# Patient Record
Sex: MALE
[De-identification: names, ages, dates, MRNs, and addresses within clinical notes are randomized per-mention and may not be internally consistent; named-entity substitution may affect disease eponyms.]

---

## 2022-04-01 ENCOUNTER — HOSPITAL ENCOUNTER (EMERGENCY)
Dept: HOSPITAL 75 - ER FS | Age: 22
Discharge: HOME | End: 2022-04-01
Payer: COMMERCIAL

## 2022-04-01 VITALS — SYSTOLIC BLOOD PRESSURE: 132 MMHG | DIASTOLIC BLOOD PRESSURE: 85 MMHG

## 2022-04-01 VITALS — HEIGHT: 70 IN | BODY MASS INDEX: 24.05 KG/M2 | WEIGHT: 167.99 LBS

## 2022-04-01 DIAGNOSIS — S61.512A: Primary | ICD-10-CM

## 2022-04-01 DIAGNOSIS — M79.9: ICD-10-CM
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
## 2022-04-01 NOTE — DIAGNOSTIC IMAGING REPORT
Indication: Left wrist injury and laceration.



Time of Exam: 8:11 AM



Two views of the left forearm were obtained. Alignment at the

elbow and wrist is normal. There appears to be a soft tissue

injury along the ulnar side of the wrist. No definite radiopaque

soft tissue foreign body is seen. No acute bony abnormality is

detected.



IMPRESSION: Soft tissue injury of the wrist. No radiopaque

foreign body or acute bony abnormality is detected.



Dictated by: 



  Dictated on workstation # [id number]

## 2022-04-01 NOTE — ED UPPER EXTREMITY
General


Chief Complaint:  Laceration


Stated Complaint:  LT HAND INJ


Source:  patient


Exam Limitations:  no limitations





History of Present Illness


Date Seen by Provider:  2022


Time Seen by Provider:  07:53


Initial Comments


21 yo male presenting with complaint of injury to left forearm. he was using a 

jointer and got his sleeve caught as he was advancing a board. he has laceration

to his wrist area with blood along his arm. He has intact movement and sensation

to fingers and wrist. He is right hand dominant. He had his last tetanus when he

was in high school. he has a history of migraine headaches and is prescribed 

Propranolol for those but takes it only as needed. He denies other injuries.


Onset:  just prior to arrival


Severity:  moderate


Pain/Injury Location:  left forearm, left wrist


Method of Injury:  other (caught in equipment)


Modifying Factors:  Worse With Movement





Allergies and Home Medications


Allergies


Coded Allergies:  


     Penicillins (Verified  Allergy, Unknown, 22)


   Per family history





Patient Home Medication List


Home Medication List Reviewed:  Yes


Clindamycin HCl (Clindamycin HCl) 300 Mg Capsule, 300 MG PO TID


   Prescribed by: TEQUILA KENNEDY on 22 0921





Review of Systems


Constitutional:  no symptoms reported


EENTM:  no symptoms reported


Respiratory:  no symptoms reported


Cardiovascular:  no symptoms reported


Gastrointestinal:  no symptoms reported


Genitourinary:  no symptoms reported


Musculoskeletal:  see HPI


Skin:  see HPI, other (laceration to left wrist)


Psychiatric/Neurological:  Denies Numbness, Denies Paresthesia, Denies Weakness





Past Medical-Social-Family Hx


Patient Social History


Tobacco Use?:  No


Use of E-Cig and/or Vaping dev:  No


Substance use?:  No


Alcohol Use?:  No





Past Medical History


Surgery/Hospitalization HX:  


Migraine Headaches





Physical Exam


Vital Signs





Vital Signs - First Documented








 22





 07:52


 


Temp 36.6


 


Pulse 73


 


Resp 18


 


B/P (MAP) 132/85 (101)


 


Pulse Ox 100


 


O2 Delivery Room Air





Capillary Refill :


Height, Weight, BMI


Height: '"


Weight: lbs. oz. kg;  BMI


Method:


General Appearance:  WD/WN, no apparent distress


HEENT:  PERRL/EOMI


Cardiovascular:  normal peripheral pulses


Elbow/Forearm:  normal ROM, Left, pain (around laceration), soft tissue 

tenderness (around laceration)


Wrist:  Yes normal ROM, Yes soft tissue tenderness


Hand:  normal inspection, non-tender, normal ROM


Neurologic/Tendon:  normal sensation, normal motor functions, normal tendon 

functions


Neurologic/Psychiatric:  no motor/sensory deficits, alert, normal mood/affect, 

oriented x 3


Skin:  warm/dry, other (avulsion/laceration left wrist ulnar aspect)





Procedures/Interventions





   Wound Location:  Upper Extremities (left wrist)


   Wound Length (cm):  4.3


   Wound's Depth, Shape:  irregular, contused tissue, sub Q


   Wound Explored:  contaminated


   Irrigated w/ Saline (ccs):  500


   Betadine Prep?:  Yes


   Anesthesia:  1% Lidocaine


   Volume Anesthetic (ccs):  6


   Suture:  Ethlion


   Suture Size:  4-0


   Number of Sutures:  5


   Layer Closure?:  1


   Sterile Dressing Applied?:  Yes


Progress


After obtaining verbal informed consent from the patient the wound was 

anesthetized with 1% plain lidocaine.  Then using sterile water and Betadine the

wound was irrigated and flushed.  No foreign bodies were seen.  Using 4-0 

Ethilon the wound edges for approximated with 5 simple interrupted stitches.  

Patient tolerated procedure well without any immediate complication.  Counseled 

on follow-up and return precautions.  Will treat with clindamycin for antibiotic

prophylaxis.  Counseled on suture removal in 10 to 14 days.  Clean dry dressing 

was applied with Ace bandage for pressure dressing.





   Wound Location:  Upper Extremities (Left wrist/forearm)


   Wound Length (cm):  1.6


   Wound's Depth, Shape:  flap, sub Q


   Wound Explored:  clean


   Irrigated w/ Saline (ccs):  500


   Betadine Prep?:  Yes


   Anesthesia:  1% Lidocaine


   Volume Anesthetic (ccs):  2


   Suture:  Ethlion


   Suture Size:  4-0


   Number of Sutures:  3


   Layer Closure?:  1


   Sterile Dressing Applied?:  Yes


Progress


After obtaining verbal informed consent from the patient the wound was anes

thetized with 1% plain lidocaine.  Then using sterile water and Betadine the 

wound was irrigated.  There was no foreign body seen or visualized.  The wound 

edges were approximated using 4-0 Ethilon.  There were a total of 3 simple 

interrupted stitches placed with a 4-0 Ethilon.  Patient tolerated procedure 

well without any immediate complication.  Applied clean dry sterile dressing.  

Counseled on follow-up and return precautions.  Advised to have stitches out in 

10 to 14 days would be seen sooner if concern for infections.  Treat with 

clindamycin for antibiotic prophylaxis.





   Wound Location:  Upper Extremities (Left wrist and forearm)


   Wound Length (cm):  2.2


   Wound's Depth, Shape:  flap, contused tissue, sub Q


   Wound Explored:  contaminated


   Irrigated w/ Saline (ccs):  500


   Betadine Prep?:  Yes


   Anesthesia:  1% Lidocaine


   Volume Anesthetic (ccs):  4


   Suture:  Ethlion


   Suture Size:  4-0


   Number of Sutures:  4


   Layer Closure?:  1


   Sterile Dressing Applied?:  Yes


Progress


After obtaining verbal informed consent from the patient the wound was 

anesthetized with 1% plain lidocaine.  Then using sterile water and Betadine the

wound was irrigated.  There was no foreign body seen or visualized.  The wound 

edges were approximated using 4-0 Ethilon.  There were a total of 4 simple 

interrupted stitches placed with a 4-0 Ethilon.  Patient tolerated procedure 

well without any immediate complication.  Applied clean dry sterile dressing.  

Counseled on follow-up and return precautions.  Advised to have stitches out in 

10 to 14 days would be seen sooner if concern for infections.  Treat with 

clindamycin for antibiotic prophylaxis.





Progress/Results/Core Measures


Results/Orders


My Orders





Orders - TEQUILA KENNEDY MD


Ibuprofen  Tablet (Motrin  Tablet) (22 08:01)


Forearm 2 View Left (22 08:01)


Lidocaine 1% Inj 20 Ml (Xylocaine 1% Inj (22 08:02)


Suture Set At Bedside (22 08:02)


Wound Dressing-Ed (22 08:02)


Lidocaine 1% Inj 50 Ml (Xylocaine 1% Inj (22 08:19)





Medications Given in ED





Current Medications








 Medications  Dose


 Ordered  Sig/Garrison


 Route  Start Time


 Stop Time Status Last Admin


Dose Admin


 


 Lidocaine HCl  50 ml  STK-MED ONCE


 .ROUTE  22 08:19


 22 08:22 DC 22 08:24


50 ML








Vital Signs/I&O











 22





 07:52


 


Temp 36.6


 


Pulse 73


 


Resp 18


 


B/P (MAP) 132/85 (101)


 


Pulse Ox 100


 


O2 Delivery Room Air











Progress


Progress Note #1:  


Progress Note


Order Ibuprofen for pain and inflammation. Clean wound and order xrays to check 

for bony injury or foreign body. Will clean wound and use lidocaine to help numb

the wound and then use stitches to help repair the tissue.


Progress Note #2:  


Progress Note


No obvious fracture or foreign body seen on the x-rays.  Wound was anesthetized 

with 1% plain lidocaine and then cleaned with Betadine and sterile water.  Using

4-0 Ethilon the wound edges were approximated with a total of 12 simple 

interrupted stitches.  Patient tolerated procedure well without any immediate 

complication.  Since this does involve his jacket and possible foreign body 

material in the wound will cover with clindamycin antibiotic for prophylactic 

coverage.  Patient states he is up-to-date on his tetanus booster.  Counseled on

using acetaminophen and ibuprofen if needed for pain.  Suture removal in 10 to 

14 days or be seen sooner if having concerns for possible infection.





Diagnostic Imaging





   Diagonstic Imaging:  Xray


   Plain Films/CT/US/NM/MRI:  forearm


Comments


                 [facility] [place], [place]





NAME:   WESLY HENRIQUEZ


MED REC#:   [id number]


ACCOUNT#:   [id number]


PT STATUS:   REG ER


:   2000


PHYSICIAN:   TEQUILA KENNEDY MD


ADMIT DATE:   22/ER FS


                                   ***Draft***


Date of Exam:22





FOREARM 2 VIEW LEFT








Indication: Left wrist injury and laceration.





Time of Exam: 8:11 AM





Two views of the left forearm were obtained. Alignment at the


elbow and wrist is normal. There appears to be a soft tissue


injury along the ulnar side of the wrist. No definite radiopaque


soft tissue foreign body is seen. No acute bony abnormality is


detected.





IMPRESSION: Soft tissue injury of the wrist. No radiopaque


foreign body or acute bony abnormality is detected.





  Dictated on workstation # [id number]








Dict:   22


Trans:   22


[facility] [id number]





Interpreted by:     LUIS BERNABE MD


Electronically signed by:


   Reviewed:  Reviewed by Me





Departure


Impression





   Primary Impression:  


   Laceration of left wrist without foreign body


   Qualified Codes:  S61.512A - Laceration without foreign body of left wrist, 

   initial encounter


   Additional Impression:  


   Soft tissue avulsion


Disposition:  01 HOME, SELF-CARE


Condition:  Stable





Departure-Patient Inst.


Decision time for Depature:  09:16


Referrals:  


TJ MOHAN DO (PCP)


Primary Care Physician


Patient Instructions:  Wound Care ED, Laceration Repair With Stitches ED





Add. Discharge Instructions:  


Keep wound clean and dry for next 24 hours. After that you may remove the 

dressing and bandages from this morning and clean with soap and water.





Do not soak the wound as it may make the stitches and wound break open. 


You may reapply antibiotic ointment and keep wound covered with bandages until 

the stitches are removed in 10-14 days. 


Definitely have the wounds covered if it may get dirty 





Use ice pack for 10-15 minutes every few hours as needed to help limit pain, 

swelling and bruising. This will help the most in this first 48 hours. 





May take ibuprofen and acetaminophen if needed for pain.





Take the full course of antibiotics to help prevent infection with the wounds.





If you see redness streaking up the wrist/forearm, fever over 101 F, or have pus

draining from the wounds then be seen right away as you would need a different 

antibiotic and may need IV antibiotics if it is getting infected.





All discharge instructions reviewed with patient and/or family. Voiced 

understanding.


Scripts


Clindamycin HCl (Clindamycin HCl) 300 Mg Capsule


300 MG PO TID for wrist laceration for 7 Days, #21 CAP 0 Refills


   Prov: TEQUILA KENNEDY MD         22


Work/School Note:  School/Childcare Release,    Date Seen in the Emergency 

Department:  2022


   Time Dismissed from Emergency Department:  09:30


   Return to School:  2022


   Restrictions:  No Restrictions


   Other Restrictions Listed Below:  Keep wound clean and covered until stitches

out in 10-14 days


   Work Release Form   Date Seen in the Emergency Department:  2022


   Return to Work:  2022


      Other Restrictions Listed Below:  Keep wound clean and covered until 

stitches out 10-14 days











TEQUILA KENNEDY MD                2022 08:08

## 2023-03-15 ENCOUNTER — HOSPITAL ENCOUNTER (EMERGENCY)
Dept: HOSPITAL 75 - ER FS | Age: 23
Discharge: HOME | End: 2023-03-15
Payer: COMMERCIAL

## 2023-03-15 VITALS — DIASTOLIC BLOOD PRESSURE: 76 MMHG | SYSTOLIC BLOOD PRESSURE: 125 MMHG

## 2023-03-15 VITALS — WEIGHT: 169.76 LBS | HEIGHT: 69.69 IN | BODY MASS INDEX: 24.58 KG/M2

## 2023-03-15 DIAGNOSIS — S93.401A: Primary | ICD-10-CM
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
## 2023-03-15 PROCEDURE — 73610 X-RAY EXAM OF ANKLE: CPT

## 2023-03-15 PROCEDURE — 99283 EMERGENCY DEPT VISIT LOW MDM: CPT

## 2023-03-15 NOTE — DIAGNOSTIC IMAGING REPORT
INDICATION: Right ankle pain



AP, oblique, and lateral views of the right ankle are obtained.



No fracture or acute bony abnormality seen. Joint spaces are

unremarkable.



IMPRESSION:



Negative right ankle.



Dictated by: 



  Dictated on workstation # [id number]

## 2023-03-15 NOTE — ED LOWER EXTREMITY
General


Chief Complaint:  Lower Extremity


Stated Complaint:  ANKLE SWOLLEN, BRUISED, TRIPPED DOWN THE STAIRS


Source:  patient


Exam Limitations:  no limitations





History of Present Illness


Date Seen by Provider:  Mar 15, 2023


Time Seen by Provider:  17:09


Initial Comments


23-year-old male with no pertinent past medical history coming in due to right 

ankle pain after twisting it missing a step roughly 15 minutes prior to arrival.

 Having constant, throbbing pain worse with stepping on it, better with rest.  

Has not taken any meds for it as of yet.  He has been able to put some weight on

it and walk a little bit.





Allergies and Home Medications


Allergies


Coded Allergies:  


     Penicillins (Verified  Allergy, Unknown, 22)


   Per family history





Patient Home Medication List


Home Medication List Reviewed:  Yes


Clindamycin HCl (Clindamycin HCl) 300 Mg Capsule, 300 MG PO TID


   Prescribed by: TEQUILA KENNEDY on 22 09





Review of Systems


Constitutional:  No fever


EENTM:  no symptoms reported


Cardiovascular:  no symptoms reported


Gastrointestinal:  no symptoms reported


Musculoskeletal:  see HPI


Skin:  no symptoms reported


Psychiatric/Neurological:  No Symptoms Reported





Past Medical-Social-Family Hx


Patient Social History


Tobacco Use?:  No


Substance use?:  No


Alcohol Use?:  No





Past Medical History


Surgery/Hospitalization HX:  


Migraine Headaches


Surgeries:  No





Physical Exam


Vital Signs





Vital Signs - First Documented








 3/15/23





 17:19


 


Temp 36.8


 


Pulse 77


 


Resp 16


 


B/P (MAP) 125/76 (92)


 


Pulse Ox 96


 


O2 Delivery Room Air





Capillary Refill :


Height, Weight, BMI


Height: '"


Weight: lbs. oz. kg; 24.00 BMI


Method:


General Appearance:  WD/WN, no apparent distress


HEENT:  PERRL/EOMI, normal ENT inspection, pharynx normal


Neck:  non-tender, full range of motion, supple, normal inspection


Cardiovascular:  regular rate, rhythm, no edema, no murmur


Respiratory:  chest non-tender, lungs clear, normal breath sounds, no 

respiratory distress, no accessory muscle use


Gastrointestinal:  normal bowel sounds


Back:  normal inspection


Knees:  bilateral knee non-tender, bilateral knee normal inspection, bilateral 

knee normal range of motion, bilateral knee no evidence of injury


Ankles:  left ankle non-tender, left ankle normal inspection, left ankle normal 

range of motion, left ankle no evidence of injury; right ankle pain, right ankle

soft tissue tenderness, right ankle swelling, right ankle other (Pain along the 

ATFL, no malleoli tenderness, no Lisfranc tenderness, no pain at the base of the

fifth metatarsal, no pain at the proximal fibula)


Neurologic/Tendon:  normal sensation, normal motor functions, normal tendon 

functions


Neurologic/Psychiatric:  no motor/sensory deficits, alert, normal mood/affect


Skin:  normal color, warm/dry





Procedures/Interventions





   Suture Size:  4-0





Progress/Results/Core Measures


Results/Orders


My Orders





Orders - TUNG NASH MD


Ibuprofen  Tablet (Motrin  Tablet) (3/15/23 17:15)


Ankle 3 View Right (3/15/23 17:12)





Medications Given in ED





Current Medications








 Medications  Dose


 Ordered  Sig/Garrison


 Route  Start Time


 Stop Time Status Last Admin


Dose Admin


 


 Ibuprofen  600 mg  ONCE  ONCE


 PO  3/15/23 17:15


 3/15/23 17:16 DC 3/15/23 17:17


600 MG








Vital Signs/I&O











 3/15/23





 17:19


 


Temp 36.8


 


Pulse 77


 


Resp 16


 


B/P (MAP) 125/76 (92)


 


Pulse Ox 96


 


O2 Delivery Room Air











Progress


Progress Note :  


Progress Note


23-year-old male with above history coming in due to right lateral ankle pain 

after twisting it.  ABCs were intact and vitals were stable on presentation.  

Physical exam with mostly right ATFL tenderness.  No significant bony tenderness

but does have some swelling.  X-ray ordered and interpreted by me showing no 

fracture or dislocation.  Given ibuprofen here for pain.  We will give him an 

Aircast and have him follow-up with orthopedics as an outpatient.  He was then 

discharged home in stable condition with strict return precautions.





Diagnostic Imaging





   Diagonstic Imaging:  Xray (ankle)


Comments


NAME:   WESLY HENRIQUEZ IVÁN


MED REC#:   [id number]


ACCOUNT#:   [id number]


PT STATUS:   REG ER


:   2000


PHYSICIAN:   TUNG NASH MD


ADMIT DATE:   03/15/23/ER FS


                                   ***Draft***


Date of Exam:03/15/23





ANKLE 3 VIEW RIGHT








INDICATION: Right ankle pain





AP, oblique, and lateral views of the right ankle are obtained.





No fracture or acute bony abnormality seen. Joint spaces are


unremarkable.





IMPRESSION:





Negative right ankle.





  Dictated on workstation # [id number]








Dict:   03/15/23 1727


Trans:   03/15/23 1730


[facility] [id number]





Interpreted by:     SYED PRICE MD


Electronically signed by:





Departure


Impression





   Primary Impression:  


   Ankle sprain


   Qualified Codes:  S93.491A - Sprain of other ligament of right ankle, initial

   encounter


Disposition:  01 HOME, SELF-CARE


Condition:  Stable





Departure-Patient Inst.


Decision time for Depature:  17:30


Referrals:  


TJ MOHAN DO (PCP)


Primary Care Physician








ANITRA GARDNER


Patient Instructions:  Ankle Sprain ED





Add. Discharge Instructions:  


Fortunately nothing is broken on x-ray.  You do have an ankle sprain in one of 

the most common areas.  Keep it wrapped and elevated to help the swelling.  Take

ibuprofen 600 mg every 6 hours as needed for pain as well as to help with the 

inflammation.  Ice it a few times a day for the next couple of days.  Follow-up 

with Nabor Gardner in [facility] if its not improving in the next couple of weeks.


Work/School Note:  School/Childcare Release,    Date Seen in the Emergency 

Department:  Mar 15, 2023


   Time Dismissed from Emergency Department:  17:19


   Return to School:  Mar 16, 2023


   Restrictions:  No PE-Until Released, No Sports-Until Released


      Work Release Form   Date Seen in the Emergency Department:  Mar 15, 2023


   Return to Work:  Mar 18, 2023


   Restrictions:  No Restrictions











TUNG NASH MD          Mar 15, 2023 17:19